# Patient Record
(demographics unavailable — no encounter records)

---

## 2024-10-30 NOTE — ASSESSMENT
[FreeTextEntry1] : 34 year old with feeling of incomplete emptying, straining at the end of urination. + cosntipation. Improvement in symptoms with elimination of dairy and improving constipation. Residual today minimal. Suspect element of pelvic floor dysfunction/dysfunctional voiding. We discussed behavioral modification strategies including fluid restriction or additional hydration; avoidance of certain foods known to be common bladder irritants such as coffee or citrus products; use of an elimination diet to determine which foods or fluids may contribute to symptoms; pelvic floor muscle relaxation and bladder training with urge suppression with pelvic floor PT.  Plan: - UA, culture - PFPT - avoid constipation - f/u 3 months

## 2024-10-30 NOTE — HISTORY OF PRESENT ILLNESS
[FreeTextEntry1] : Name Yousif Delgadillo (Bulmaro) MRN [id number]   Dec 31 1989  Contact Number: [phone] ----------------------------------------------------------------------------------------------------------------------------------------- Date of First Visit: 24 Referring Provider/PCP: Dr. Abdifatah tolbert: [phone] -----------------------------------------------------------------------------------------------------------------------------------------  CC: feeling of incomplete emptying  History of Present Illness: Yousif Delgadillo is a 34 year old female who reports over the past few years has felt that she has not been emptying bladder completely. Will sometimes strain at end of urination to fully empty. Reports chronically holds her urine in - constantly delaying going. No significant frequency - reports does not stay very well hydrated. No significant urgency. Nocturia 0-2x. No associated pain, sometimes feels suprapubic pressure when feels like not fully empty. Sometimes good flow, other times weak, can be stop and start at times. No burning. Reports in past was told not fully emptying when needed to empty for TV US and had to go back to urinate more. Some days worse than others. Lately things have been better with regards to urination - has eliminated dairy and feels this has helped bloating and gas. Reports significant constipation which eliminating dairy has helped.  Denies history of frequent UTIs. No history of hematuria.   Bladder triggers: caffeine - matcha lattes up to 5x/week, a lot of carbonation, +++citrus, no spicy foods, occasional tomatoes, rare pineapple, social alcohol, non-smoking   Comorbid conditions: no neurologic diseases, no mobility deficits, + constipation, + pelvic pain around ovulation, irregular periods  PVR (to ensure adequate emptying): 0 ---------------------------------------------------------------------------------------------------------------------------------------- Interval History (10/30/2024): UA with + RBC - patient reports has been spotting over this time. Will plan for repeat UA/culture when not spotting. UA 3 RBC - patient reports that missed period month prior, so thought was fine to leave sample- left sample and then got her period immediately after. She is having labwork done with gyn for irregular period - she would like to repeat urine when she returns for fu end of the month.   ---------------------------------------------------------------------------------------------------------------------------------------- PMH: depression, ADHD PSH: none Family History: maternal grandfather prostate cancer  Social: single in a relationship, no children, never smoker, occasional marijuana, social alcohol Meds: vyvanse, prozac, dupixent Allergies: aspirin, ibuprofen, compazine, pistachios, cashews, horses, cats, nickel, dustmites ROS: no fever, chills, flank pain, dysuria

## 2024-10-30 NOTE — LETTER BODY
[Dear  ___] : Dear  [unfilled], [Courtesy Letter:] : I had the pleasure of seeing your patient, [unfilled], in my office today. [Please see my note below.] : Please see my note below. [Consult Closing:] : Thank you very much for allowing me to participate in the care of this patient.  If you have any questions, please do not hesitate to contact me. [Sincerely,] : Sincerely, [FreeTextEntry3] : Jennie Delgadillo MD Director of Robotic Education [facility]   [email or web address] [phone]

## 2024-10-30 NOTE — PHYSICAL EXAM
[General Appearance - Well Developed] : well developed [General Appearance - Well Nourished] : well nourished [de-identified] : + point tenderness and tightness pelvic floor